# Patient Record
Sex: FEMALE | Race: WHITE | NOT HISPANIC OR LATINO | Employment: FULL TIME | URBAN - METROPOLITAN AREA
[De-identification: names, ages, dates, MRNs, and addresses within clinical notes are randomized per-mention and may not be internally consistent; named-entity substitution may affect disease eponyms.]

---

## 2022-07-20 ENCOUNTER — OFFICE VISIT (OUTPATIENT)
Dept: URGENT CARE | Facility: CLINIC | Age: 62
End: 2022-07-20
Payer: COMMERCIAL

## 2022-07-20 VITALS
TEMPERATURE: 98 F | OXYGEN SATURATION: 97 % | WEIGHT: 200 LBS | DIASTOLIC BLOOD PRESSURE: 84 MMHG | SYSTOLIC BLOOD PRESSURE: 125 MMHG | HEIGHT: 68 IN | HEART RATE: 63 BPM | BODY MASS INDEX: 30.31 KG/M2 | RESPIRATION RATE: 16 BRPM

## 2022-07-20 DIAGNOSIS — B96.89 BACTERIAL URI: Primary | ICD-10-CM

## 2022-07-20 DIAGNOSIS — R05.9 COUGH: ICD-10-CM

## 2022-07-20 DIAGNOSIS — J06.9 BACTERIAL URI: Primary | ICD-10-CM

## 2022-07-20 LAB
CTP QC/QA: YES
SARS-COV-2 RDRP RESP QL NAA+PROBE: NEGATIVE

## 2022-07-20 PROCEDURE — U0002: ICD-10-PCS | Mod: QW,S$GLB,, | Performed by: PHYSICIAN ASSISTANT

## 2022-07-20 PROCEDURE — 3074F PR MOST RECENT SYSTOLIC BLOOD PRESSURE < 130 MM HG: ICD-10-PCS | Mod: CPTII,S$GLB,, | Performed by: PHYSICIAN ASSISTANT

## 2022-07-20 PROCEDURE — 3008F BODY MASS INDEX DOCD: CPT | Mod: CPTII,S$GLB,, | Performed by: PHYSICIAN ASSISTANT

## 2022-07-20 PROCEDURE — 1160F PR REVIEW ALL MEDS BY PRESCRIBER/CLIN PHARMACIST DOCUMENTED: ICD-10-PCS | Mod: CPTII,S$GLB,, | Performed by: PHYSICIAN ASSISTANT

## 2022-07-20 PROCEDURE — U0002 COVID-19 LAB TEST NON-CDC: HCPCS | Mod: QW,S$GLB,, | Performed by: PHYSICIAN ASSISTANT

## 2022-07-20 PROCEDURE — 1160F RVW MEDS BY RX/DR IN RCRD: CPT | Mod: CPTII,S$GLB,, | Performed by: PHYSICIAN ASSISTANT

## 2022-07-20 PROCEDURE — 3079F DIAST BP 80-89 MM HG: CPT | Mod: CPTII,S$GLB,, | Performed by: PHYSICIAN ASSISTANT

## 2022-07-20 PROCEDURE — 1159F PR MEDICATION LIST DOCUMENTED IN MEDICAL RECORD: ICD-10-PCS | Mod: CPTII,S$GLB,, | Performed by: PHYSICIAN ASSISTANT

## 2022-07-20 PROCEDURE — 3008F PR BODY MASS INDEX (BMI) DOCUMENTED: ICD-10-PCS | Mod: CPTII,S$GLB,, | Performed by: PHYSICIAN ASSISTANT

## 2022-07-20 PROCEDURE — 99203 OFFICE O/P NEW LOW 30 MIN: CPT | Mod: S$GLB,,, | Performed by: PHYSICIAN ASSISTANT

## 2022-07-20 PROCEDURE — 3079F PR MOST RECENT DIASTOLIC BLOOD PRESSURE 80-89 MM HG: ICD-10-PCS | Mod: CPTII,S$GLB,, | Performed by: PHYSICIAN ASSISTANT

## 2022-07-20 PROCEDURE — 3074F SYST BP LT 130 MM HG: CPT | Mod: CPTII,S$GLB,, | Performed by: PHYSICIAN ASSISTANT

## 2022-07-20 PROCEDURE — 99203 PR OFFICE/OUTPT VISIT, NEW, LEVL III, 30-44 MIN: ICD-10-PCS | Mod: S$GLB,,, | Performed by: PHYSICIAN ASSISTANT

## 2022-07-20 PROCEDURE — 1159F MED LIST DOCD IN RCRD: CPT | Mod: CPTII,S$GLB,, | Performed by: PHYSICIAN ASSISTANT

## 2022-07-20 RX ORDER — AZITHROMYCIN 250 MG/1
TABLET, FILM COATED ORAL
Qty: 6 TABLET | Refills: 0 | Status: SHIPPED | OUTPATIENT
Start: 2022-07-20

## 2022-07-20 RX ORDER — ALBUTEROL SULFATE 90 UG/1
2 AEROSOL, METERED RESPIRATORY (INHALATION) EVERY 6 HOURS PRN
Qty: 18 G | Refills: 0 | Status: SHIPPED | OUTPATIENT
Start: 2022-07-20 | End: 2022-07-22

## 2022-07-20 RX ORDER — PROMETHAZINE HYDROCHLORIDE AND DEXTROMETHORPHAN HYDROBROMIDE 6.25; 15 MG/5ML; MG/5ML
5 SYRUP ORAL EVERY 8 HOURS PRN
Qty: 118 ML | Refills: 0 | Status: SHIPPED | OUTPATIENT
Start: 2022-07-20 | End: 2022-07-27

## 2022-07-21 NOTE — PATIENT INSTRUCTIONS
PLEASE READ YOUR DISCHARGE INSTRUCTIONS ENTIRELY AS IT CONTAINS IMPORTANT INFORMATION.  - Rest.    - Drink plenty of fluids.    - Tylenol or Ibuprofen as directed as needed for fever/pain.    - If you were prescribed antibiotics, please take them to completion.  - If you are female and on birth control pills - please use additional methods of contraception to prevent pregnancy while on antibiotics and for one cycle after.   - If you were prescribed a narcotic medication, a cough syrup, or a muscle relaxer, do not drive or operate heavy equipment or machinery while taking these medications, as they can cause drowsiness.   - If a referral to a specialty was made today, you should receive a phone call in the next few days to schedule an appt.  Please call [phone] to schedule an appt if have not gotten a phone call in the next few days.  - If you smoke, please stop smoking.  -You must understand that you've received an Urgent Care treatment only and that you may be released before all your medical problems are known or treated. You, the patient, will arrange for follow up care as instructed. Please arrange follow up with your primary medical clinic as soon as possible.   - Follow up with your PCP or specialty clinic as directed in the next 1-2 weeks if not improved or as needed.  You can call [phone] to schedule an appointment with the appropriate provider.    - Please return to Urgent Care or to the Emergency Department if your symptoms worsen.    Patient aware and verbalized understanding.

## 2022-07-21 NOTE — PROGRESS NOTES
"Subjective:       Patient ID: Daniela Chandler is a 62 y.o. female.    Vitals:  height is 5' 8" (1.727 m) and weight is 90.7 kg (200 lb). Her temperature is 98 °F (36.7 °C). Her blood pressure is 125/84 and her pulse is 63. Her respiration is 16 and oxygen saturation is 97%.     Chief Complaint: Cough    Ms. Chandler presents for evaluation of cough, headache, PND, wheezing, congestion that started about two weeks ago.  She is an active smoker.  She denies any fevers, chills, shortness of breath, chest pain, leg swelling, nausea, vomiting, diarrhea, anosmia or ageusia.  She has tried Mucinex DM and OTC cough suppressant with little relief.          Cough  This is a new problem. The current episode started 1 to 4 weeks ago. The problem has been unchanged. The problem occurs constantly. The cough is non-productive. Associated symptoms include headaches, nasal congestion, postnasal drip and wheezing. Pertinent negatives include no chest pain, chills, ear pain, fever, myalgias, rash, sore throat or shortness of breath. Nothing aggravates the symptoms. She has tried OTC cough suppressant (Mucinex Dm) for the symptoms. The treatment provided no relief. Her past medical history is significant for asthma.       Constitution: Negative for appetite change, chills, sweating, fatigue and fever.   HENT: Positive for congestion and postnasal drip. Negative for ear pain, ear discharge, hearing loss, drooling, sinus pain, sinus pressure and sore throat.    Neck: Negative for neck stiffness and painful lymph nodes.   Cardiovascular: Negative for chest trauma, chest pain, leg swelling, palpitations, sob on exertion and passing out.   Eyes: Negative for eye pain and blurred vision.   Respiratory: Positive for cough and wheezing. Negative for chest tightness, sputum production and shortness of breath.    Gastrointestinal: Negative for abdominal pain, nausea, vomiting and diarrhea.   Genitourinary: Negative for dysuria, frequency " and urgency.   Musculoskeletal: Negative for joint pain, joint swelling, muscle cramps and muscle ache.   Skin: Negative for rash.   Allergic/Immunologic: Negative for itching and sneezing.   Neurological: Positive for headaches. Negative for dizziness, history of vertigo, light-headedness, passing out, facial drooping, speech difficulty, coordination disturbances, loss of balance and altered mental status.   Hematologic/Lymphatic: Negative for swollen lymph nodes and easy bruising/bleeding. Does not bruise/bleed easily.   Psychiatric/Behavioral: Negative for altered mental status.       Objective:      Physical Exam   Constitutional: She is oriented to person, place, and time. She appears well-developed. She is cooperative.  Non-toxic appearance. She does not appear ill. No distress.   HENT:   Head: Normocephalic and atraumatic.   Ears:   Right Ear: Hearing, tympanic membrane, external ear and ear canal normal.   Left Ear: Hearing, tympanic membrane, external ear and ear canal normal.   Nose: Nose normal. No mucosal edema, rhinorrhea or nasal deformity. No epistaxis. Right sinus exhibits no maxillary sinus tenderness and no frontal sinus tenderness. Left sinus exhibits no maxillary sinus tenderness and no frontal sinus tenderness.   Mouth/Throat: Uvula is midline, oropharynx is clear and moist and mucous membranes are normal. No trismus in the jaw. Normal dentition. No uvula swelling. No oropharyngeal exudate, posterior oropharyngeal edema or posterior oropharyngeal erythema. No tonsillar exudate.   Eyes: Conjunctivae and lids are normal. No scleral icterus.   Neck: Trachea normal and phonation normal. Neck supple. No edema present. No erythema present. No neck rigidity present.   Cardiovascular: Normal rate, regular rhythm, normal heart sounds and normal pulses.   Pulmonary/Chest: Effort normal. No stridor. No respiratory distress. She has no decreased breath sounds. She has wheezes. She has no rhonchi. She has no  rales.   Exp wheeze         Comments: Exp wheeze    Abdominal: Normal appearance.   Musculoskeletal: Normal range of motion.         General: No deformity. Normal range of motion.   Neurological: She is alert and oriented to person, place, and time. She exhibits normal muscle tone. Coordination normal.   Skin: Skin is warm, dry, intact, not diaphoretic and not pale.   Psychiatric: Her speech is normal and behavior is normal. Judgment and thought content normal.   Nursing note and vitals reviewed.          Results for orders placed or performed in visit on 07/20/22   POCT COVID-19 Rapid Screening   Result Value Ref Range    POC Rapid COVID Negative Negative     Acceptable Yes        Assessment:       1. Bacterial URI    2. Cough          Plan:         Bacterial URI    Cough  -     POCT COVID-19 Rapid Screening    Other orders  -     promethazine-dextromethorphan (PROMETHAZINE-DM) 6.25-15 mg/5 mL Syrp; Take 5 mLs by mouth every 8 (eight) hours as needed (cough).  Dispense: 118 mL; Refill: 0  -     azithromycin (Z-ANMOL) 250 MG tablet; Take 2 tablets by mouth on day 1; Take 1 tablet by mouth on days 2-5  Dispense: 6 tablet; Refill: 0  -     albuterol (PROAIR HFA) 90 mcg/actuation inhaler; Inhale 2 puffs into the lungs every 6 (six) hours as needed for Wheezing. Rescue  Dispense: 18 g; Refill: 0    Diagnoses and plan discussed with the patient, as well as the expected course and duration of her symptoms. All questions and concerns were addressed prior to discharge.  She was advised to follow up with her PCP within 1 week if symptoms do not improve. Emergency department precautions were given. Patient verbalized understanding and was happy with the plan of care.   Note dictated with voice recognition software, please excuse any grammatical errors.    Patient Instructions   PLEASE READ YOUR DISCHARGE INSTRUCTIONS ENTIRELY AS IT CONTAINS IMPORTANT INFORMATION.  - Rest.    - Drink plenty of fluids.    - Tylenol  or Ibuprofen as directed as needed for fever/pain.    - If you were prescribed antibiotics, please take them to completion.  - If you are female and on birth control pills - please use additional methods of contraception to prevent pregnancy while on antibiotics and for one cycle after.   - If you were prescribed a narcotic medication, a cough syrup, or a muscle relaxer, do not drive or operate heavy equipment or machinery while taking these medications, as they can cause drowsiness.   - If a referral to a specialty was made today, you should receive a phone call in the next few days to schedule an appt.  Please call [phone] to schedule an appt if have not gotten a phone call in the next few days.  - If you smoke, please stop smoking.  -You must understand that you've received an Urgent Care treatment only and that you may be released before all your medical problems are known or treated. You, the patient, will arrange for follow up care as instructed. Please arrange follow up with your primary medical clinic as soon as possible.   - Follow up with your PCP or specialty clinic as directed in the next 1-2 weeks if not improved or as needed.  You can call [phone] to schedule an appointment with the appropriate provider.    - Please return to Urgent Care or to the Emergency Department if your symptoms worsen.    Patient aware and verbalized understanding.

## 2025-04-23 ENCOUNTER — LAB VISIT (OUTPATIENT)
Dept: LAB | Facility: HOSPITAL | Age: 65
End: 2025-04-23
Attending: FAMILY MEDICINE
Payer: COMMERCIAL

## 2025-04-23 ENCOUNTER — OFFICE VISIT (OUTPATIENT)
Dept: FAMILY MEDICINE | Facility: CLINIC | Age: 65
End: 2025-04-23
Payer: COMMERCIAL

## 2025-04-23 VITALS
WEIGHT: 178 LBS | SYSTOLIC BLOOD PRESSURE: 125 MMHG | HEIGHT: 68 IN | DIASTOLIC BLOOD PRESSURE: 80 MMHG | BODY MASS INDEX: 26.98 KG/M2

## 2025-04-23 DIAGNOSIS — F41.9 ANXIETY AND DEPRESSION: ICD-10-CM

## 2025-04-23 DIAGNOSIS — K90.0 CELIAC DISEASE: ICD-10-CM

## 2025-04-23 DIAGNOSIS — Z76.89 ENCOUNTER TO ESTABLISH CARE WITH NEW DOCTOR: ICD-10-CM

## 2025-04-23 DIAGNOSIS — R73.03 PREDIABETES: ICD-10-CM

## 2025-04-23 DIAGNOSIS — Z01.419 ENCOUNTER FOR ANNUAL ROUTINE GYNECOLOGICAL EXAMINATION: ICD-10-CM

## 2025-04-23 DIAGNOSIS — Z12.31 SCREENING MAMMOGRAM FOR BREAST CANCER: ICD-10-CM

## 2025-04-23 DIAGNOSIS — E03.9 HYPOTHYROIDISM, UNSPECIFIED TYPE: ICD-10-CM

## 2025-04-23 DIAGNOSIS — Z23 ENCOUNTER FOR IMMUNIZATION: ICD-10-CM

## 2025-04-23 DIAGNOSIS — E78.5 HYPERLIPIDEMIA, UNSPECIFIED HYPERLIPIDEMIA TYPE: ICD-10-CM

## 2025-04-23 DIAGNOSIS — Z00.00 ANNUAL PHYSICAL EXAM: Primary | ICD-10-CM

## 2025-04-23 DIAGNOSIS — Z12.11 ENCOUNTER FOR SCREENING COLONOSCOPY: ICD-10-CM

## 2025-04-23 DIAGNOSIS — Z00.00 ANNUAL PHYSICAL EXAM: ICD-10-CM

## 2025-04-23 DIAGNOSIS — F32.A ANXIETY AND DEPRESSION: ICD-10-CM

## 2025-04-23 LAB
ABSOLUTE EOSINOPHIL (OHS): 0.09 K/UL
ABSOLUTE MONOCYTE (OHS): 0.49 K/UL (ref 0.3–1)
ABSOLUTE NEUTROPHIL COUNT (OHS): 2.62 K/UL (ref 1.8–7.7)
ALBUMIN SERPL BCP-MCNC: 3.8 G/DL (ref 3.5–5.2)
ALP SERPL-CCNC: 134 UNIT/L (ref 40–150)
ALT SERPL W/O P-5'-P-CCNC: 11 UNIT/L (ref 10–44)
ANION GAP (OHS): 7 MMOL/L (ref 8–16)
AST SERPL-CCNC: 15 UNIT/L (ref 11–45)
BASOPHILS # BLD AUTO: 0.02 K/UL
BASOPHILS NFR BLD AUTO: 0.4 %
BILIRUB SERPL-MCNC: 0.9 MG/DL (ref 0.1–1)
BUN SERPL-MCNC: 9 MG/DL (ref 8–23)
CALCIUM SERPL-MCNC: 9.5 MG/DL (ref 8.7–10.5)
CHLORIDE SERPL-SCNC: 106 MMOL/L (ref 95–110)
CHOLEST SERPL-MCNC: 124 MG/DL (ref 120–199)
CHOLEST/HDLC SERPL: 2.3 {RATIO} (ref 2–5)
CO2 SERPL-SCNC: 25 MMOL/L (ref 23–29)
CREAT SERPL-MCNC: 0.7 MG/DL (ref 0.5–1.4)
EAG (OHS): 111 MG/DL (ref 68–131)
ERYTHROCYTE [DISTWIDTH] IN BLOOD BY AUTOMATED COUNT: 12.9 % (ref 11.5–14.5)
GFR SERPLBLD CREATININE-BSD FMLA CKD-EPI: >60 ML/MIN/1.73/M2
GLUCOSE SERPL-MCNC: 103 MG/DL (ref 70–110)
HBA1C MFR BLD: 5.5 % (ref 4–5.6)
HCT VFR BLD AUTO: 39 % (ref 37–48.5)
HDLC SERPL-MCNC: 53 MG/DL (ref 40–75)
HDLC SERPL: 42.7 % (ref 20–50)
HGB BLD-MCNC: 13 GM/DL (ref 12–16)
IMM GRANULOCYTES # BLD AUTO: 0.01 K/UL (ref 0–0.04)
IMM GRANULOCYTES NFR BLD AUTO: 0.2 % (ref 0–0.5)
IRON SATN MFR SERPL: 37 % (ref 20–50)
IRON SERPL-MCNC: 144 UG/DL (ref 30–160)
LDLC SERPL CALC-MCNC: 40.8 MG/DL (ref 63–159)
LYMPHOCYTES # BLD AUTO: 1.52 K/UL (ref 1–4.8)
MCH RBC QN AUTO: 29 PG (ref 27–31)
MCHC RBC AUTO-ENTMCNC: 33.3 G/DL (ref 32–36)
MCV RBC AUTO: 87 FL (ref 82–98)
NONHDLC SERPL-MCNC: 71 MG/DL
NUCLEATED RBC (/100WBC) (OHS): 0 /100 WBC
PLATELET # BLD AUTO: 149 K/UL (ref 150–450)
PMV BLD AUTO: 12 FL (ref 9.2–12.9)
POTASSIUM SERPL-SCNC: 4.3 MMOL/L (ref 3.5–5.1)
PROT SERPL-MCNC: 7.1 GM/DL (ref 6–8.4)
RBC # BLD AUTO: 4.49 M/UL (ref 4–5.4)
RELATIVE EOSINOPHIL (OHS): 1.9 %
RELATIVE LYMPHOCYTE (OHS): 32 % (ref 18–48)
RELATIVE MONOCYTE (OHS): 10.3 % (ref 4–15)
RELATIVE NEUTROPHIL (OHS): 55.2 % (ref 38–73)
SODIUM SERPL-SCNC: 138 MMOL/L (ref 136–145)
T4 FREE SERPL-MCNC: 1.06 NG/DL (ref 0.71–1.51)
TIBC SERPL-MCNC: 392 UG/DL (ref 250–450)
TRANSFERRIN SERPL-MCNC: 265 MG/DL (ref 200–375)
TRIGL SERPL-MCNC: 151 MG/DL (ref 30–150)
TSH SERPL-ACNC: 0.01 UIU/ML (ref 0.4–4)
WBC # BLD AUTO: 4.75 K/UL (ref 3.9–12.7)

## 2025-04-23 PROCEDURE — 1159F MED LIST DOCD IN RCRD: CPT | Mod: CPTII,S$GLB,, | Performed by: FAMILY MEDICINE

## 2025-04-23 PROCEDURE — 80061 LIPID PANEL: CPT

## 2025-04-23 PROCEDURE — 3008F BODY MASS INDEX DOCD: CPT | Mod: CPTII,S$GLB,, | Performed by: FAMILY MEDICINE

## 2025-04-23 PROCEDURE — 84466 ASSAY OF TRANSFERRIN: CPT

## 2025-04-23 PROCEDURE — 36415 COLL VENOUS BLD VENIPUNCTURE: CPT

## 2025-04-23 PROCEDURE — 83036 HEMOGLOBIN GLYCOSYLATED A1C: CPT

## 2025-04-23 PROCEDURE — 85025 COMPLETE CBC W/AUTO DIFF WBC: CPT

## 2025-04-23 PROCEDURE — 90471 IMMUNIZATION ADMIN: CPT | Mod: S$GLB,,, | Performed by: FAMILY MEDICINE

## 2025-04-23 PROCEDURE — 90677 PCV20 VACCINE IM: CPT | Mod: S$GLB,,, | Performed by: FAMILY MEDICINE

## 2025-04-23 PROCEDURE — 99386 PREV VISIT NEW AGE 40-64: CPT | Mod: 25,S$GLB,, | Performed by: FAMILY MEDICINE

## 2025-04-23 PROCEDURE — 99999 PR PBB SHADOW E&M-EST. PATIENT-LVL IV: CPT | Mod: PBBFAC,,, | Performed by: FAMILY MEDICINE

## 2025-04-23 PROCEDURE — 3079F DIAST BP 80-89 MM HG: CPT | Mod: CPTII,S$GLB,, | Performed by: FAMILY MEDICINE

## 2025-04-23 PROCEDURE — 84439 ASSAY OF FREE THYROXINE: CPT

## 2025-04-23 PROCEDURE — 80053 COMPREHEN METABOLIC PANEL: CPT

## 2025-04-23 PROCEDURE — 3074F SYST BP LT 130 MM HG: CPT | Mod: CPTII,S$GLB,, | Performed by: FAMILY MEDICINE

## 2025-04-23 RX ORDER — MONTELUKAST SODIUM 10 MG/1
TABLET ORAL
COMMUNITY

## 2025-04-23 RX ORDER — ROSUVASTATIN CALCIUM 10 MG/1
10 TABLET, COATED ORAL NIGHTLY
COMMUNITY
Start: 2024-12-23

## 2025-04-23 RX ORDER — ATORVASTATIN CALCIUM 10 MG/1
10 TABLET, FILM COATED ORAL
COMMUNITY
End: 2025-04-23

## 2025-04-23 RX ORDER — THYROID 120 MG/1
120 TABLET ORAL
COMMUNITY
Start: 2025-03-17

## 2025-04-23 RX ORDER — FLUOXETINE HYDROCHLORIDE 40 MG/1
CAPSULE ORAL
COMMUNITY

## 2025-04-23 RX ORDER — LEVOTHYROXINE SODIUM 50 UG/1
50 TABLET ORAL
COMMUNITY

## 2025-04-23 RX ORDER — METFORMIN HYDROCHLORIDE 500 MG/1
500 TABLET ORAL 2 TIMES DAILY WITH MEALS
COMMUNITY

## 2025-04-23 RX ORDER — BISOPROLOL FUMARATE 5 MG/1
TABLET, FILM COATED ORAL
COMMUNITY
Start: 2024-12-21

## 2025-04-23 NOTE — PROGRESS NOTES
(Portions of this note were dictated using voice recognition software and may contain dictation related errors in spelling/grammar/syntax not found on text review)    CC:   Chief Complaint   Patient presents with    [facility]       HPI: 65 y.o. female presented to [facility] as a new pt for routine annual check up and labs. She has medical history significant for prediabetes, HLD, nicotine dependence, hypothyroidism, anxiety and depression, celiac disease.    Prediabetes: she was taking metformin for years, states she had GI side effects with metformin and it made her sick, she stopped taking it few month ago, since then she has been feeling more tired. She has been monitoring her blood sugars at home and presented with a log, review of the log seems like that fasting blood sugars are less than 126. Last hba1c 6 months ago was 5.9    She used to follow up with GI for celiac disease, reports having symptoms of indigestion and would like to have EGD as it has been several years    MVP: She takes bisoprolol for MVP, denies having any palpitations or chest pain    She takes armour thyroid 120 mg daily, she is due for tsh check    She takes Prozac 40 mg in the morning and Zoloft in the evening for anxiety and depression, reports compliance and symptoms have been stable    She is an active smoker, plans to quit soon as she will be going for gum surgery    She is due for annual labs    She is due for screening mammogram, Pap smear and colonoscopy    No other concerns    No past medical history on file.    No past surgical history on file.    No family history on file.    Social History[1]    Lab Results   Component Value Date    WBC 6.58 02/19/2010    HGB 13.1 02/19/2010    HCT 38.4 02/19/2010    MCV 87.5 02/19/2010     02/19/2010     02/19/2010    K 3.9 02/19/2010     02/19/2010    CREATININE 0.7 02/19/2010    ESTGFRAFRICA >60 02/19/2010    EGFRNONAA >60 02/19/2010    CALCIUM 9.1 02/19/2010     BUN 7 02/19/2010    CO2 28 02/19/2010    INR 1.0 02/19/2010     02/19/2010             Vital signs reviewed  PE:   APPEARANCE: Well nourished, well developed, in no acute distress.    HEAD: Normocephalic, atraumatic.  EYES: EOMI.  Conjunctivae noninjected.  NOSE: Mucosa pink. Airway clear.  NECK: Supple with no cervical lymphadenopathy.    CHEST: Good inspiratory effort. Lungs clear to auscultation with no wheezes or crackles.  CARDIOVASCULAR: Normal S1, S2. No rubs, murmurs, or gallops.  ABDOMEN: Bowel sounds normal. Not distended. Soft. No tenderness or masses. No organomegaly.  EXTREMITIES: No edema, cyanosis, or clubbing.    Review of Systems   Constitutional:  Negative for chills, fatigue and fever.   HENT: Negative.     Respiratory:  Negative for cough, shortness of breath and wheezing.    Cardiovascular:  Negative for chest pain, palpitations and leg swelling.   Gastrointestinal: Negative.    Genitourinary: Negative.    Neurological: Negative.    Psychiatric/Behavioral: Negative.     All other systems reviewed and are negative.      IMPRESSION  1. Annual physical exam    2. Screening mammogram for breast cancer    3. Encounter for screening colonoscopy    4. Celiac disease    5. Encounter for annual routine gynecological examination    6. Encounter for immunization    7. Encounter to establish care with new doctor    8. Hypothyroidism, unspecified type    9. Prediabetes    10. Hyperlipidemia, unspecified hyperlipidemia type    11. Anxiety and depression            PLAN      1. Annual physical exam (Primary)    - CBC Auto Differential; Future  - Comprehensive Metabolic Panel; Future  - Hemoglobin A1C; Future  - Lipid Panel; Future  - TSH; Future      2. Screening mammogram for breast cancer    - Mammo Digital Screening Bilat w/ Chano (XPD); Future      3. Encounter for screening colonoscopy    - Ambulatory referral/consult to Endo Procedure ; Future      4. Celiac disease    - Ambulatory  referral/consult to Gastroenterology; Future      5. Encounter for annual routine gynecological examination    - Ambulatory referral/consult to Gynecology; Future      6. Encounter for immunization    - pneumoc 20-annette conj-dip cr(PF) (PREVNAR-20 (PF)) injection Syrg 0.5 mL      7. Encounter to establish care with new doctor        8. Hypothyroidism, unspecified type    Tsh ordered    Continue synthyroid      9. Prediabetes    To check A1c      10. Hyperlipidemia, unspecified hyperlipidemia type    Continue Crestor      11. Anxiety and depression    Stable    Continue current medications        SCREENINGS        Age/demographic appropriate health maintenance:    Health Maintenance Due   Topic Date Due    Lipid Panel  Never done    Mammogram  Never done    Pneumococcal Vaccines (Age 50+) (1 of 2 - PCV) Never done    Hemoglobin A1c (Diabetic Prevention Screening)  Never done    Colorectal Cancer Screening  Never done           Spent adequate time in obtaining history and explaining differentials     40 minutes spent during this visit of which greater than 50% devoted to face-face counseling and coordination of care regarding diagnosis and management plan       Raven Mcdaniel   4/23/2025       [1]   Social History  Tobacco Use    Smoking status: Every Day     Current packs/day: 0.25     Average packs/day: 0.3 packs/day for 40.0 years (10.0 ttl pk-yrs)     Types: Cigarettes    Smokeless tobacco: Current

## 2025-04-30 ENCOUNTER — TELEPHONE (OUTPATIENT)
Dept: OBSTETRICS AND GYNECOLOGY | Facility: CLINIC | Age: 65
End: 2025-04-30
Payer: COMMERCIAL

## 2025-04-30 ENCOUNTER — OFFICE VISIT (OUTPATIENT)
Dept: OBSTETRICS AND GYNECOLOGY | Facility: CLINIC | Age: 65
End: 2025-04-30
Payer: COMMERCIAL

## 2025-04-30 VITALS
DIASTOLIC BLOOD PRESSURE: 83 MMHG | WEIGHT: 182.13 LBS | HEART RATE: 67 BPM | BODY MASS INDEX: 27.69 KG/M2 | SYSTOLIC BLOOD PRESSURE: 127 MMHG

## 2025-04-30 DIAGNOSIS — Z01.419 ENCOUNTER FOR ANNUAL ROUTINE GYNECOLOGICAL EXAMINATION: ICD-10-CM

## 2025-04-30 DIAGNOSIS — Z01.419 WELL WOMAN EXAM WITH ROUTINE GYNECOLOGICAL EXAM: Primary | ICD-10-CM

## 2025-04-30 DIAGNOSIS — N76.0 ACUTE VAGINITIS: ICD-10-CM

## 2025-04-30 PROCEDURE — 3008F BODY MASS INDEX DOCD: CPT | Mod: CPTII,S$GLB,,

## 2025-04-30 PROCEDURE — 3044F HG A1C LEVEL LT 7.0%: CPT | Mod: CPTII,S$GLB,,

## 2025-04-30 PROCEDURE — 1160F RVW MEDS BY RX/DR IN RCRD: CPT | Mod: CPTII,S$GLB,,

## 2025-04-30 PROCEDURE — 3079F DIAST BP 80-89 MM HG: CPT | Mod: CPTII,S$GLB,,

## 2025-04-30 PROCEDURE — 99386 PREV VISIT NEW AGE 40-64: CPT | Mod: S$GLB,,,

## 2025-04-30 PROCEDURE — 99999 PR PBB SHADOW E&M-EST. PATIENT-LVL III: CPT | Mod: PBBFAC,,,

## 2025-04-30 PROCEDURE — 3074F SYST BP LT 130 MM HG: CPT | Mod: CPTII,S$GLB,,

## 2025-04-30 PROCEDURE — 1159F MED LIST DOCD IN RCRD: CPT | Mod: CPTII,S$GLB,,

## 2025-04-30 RX ORDER — NEBIVOLOL 5 MG/1
10 TABLET ORAL DAILY
COMMUNITY

## 2025-04-30 RX ORDER — SERTRALINE HYDROCHLORIDE 50 MG/1
50 TABLET, FILM COATED ORAL DAILY
COMMUNITY

## 2025-04-30 NOTE — TELEPHONE ENCOUNTER
Called pt regarding her appt for today at 4:20 offering her a 1:40appt instead. Pt denied that time.

## 2025-04-30 NOTE — TELEPHONE ENCOUNTER
----- Message from Susan sent at 4/30/2025 10:35 AM CDT -----  Type:  Patient Returning CallWho Called: pt Who Left Message for Patient: officeDoes the patient know what this is regarding?:appointment that is scheduled for Today 4/30 Would the patient rather a call back or a response via [facility]?  Call back Best Call Back Number: [phone] Information:

## 2025-04-30 NOTE — PROGRESS NOTES
"SUBJECTIVE:   65 y.o. female  presents for annual well woman exam.   No LMP recorded. Patient is postmenopausal. Age of first menarche: 13/14.  at age 52, not on HRT. Denies  bleeding.  Is currently sexually active with one male. Declines STD testing with blood work.  Reports some abnormal vaginal odor "for a while". She takes long hot baths with fragrant body wash and bubble baths.   She is also c/o hot flashes that occur occasionally since menopause. She is not interested in hormonal therapy.  Denies any breast, urinary, or bowel complaints.     Established care with new PCP on 25.    Denies domestic violence.        Family history: No family history of breast, ovarian, endometrial and colon cancer    Pap >10 years ago  -History of abnormal pap-denies  MMG-scheduled, ordered by PCP  Colonoscopy-ordered by PCP  DEXA at age 67           Past Medical History:   Diagnosis Date    Anxiety disorder, unspecified     Hashimoto's disease     High cholesterol     Hypertension      History reviewed. No pertinent surgical history.  Social History[1]  Family History   Problem Relation Name Age of Onset    Breast cancer Neg Hx      Cervical cancer Neg Hx      Ovarian cancer Neg Hx      Uterine cancer Neg Hx      Colon cancer Neg Hx      Fibroids Neg Hx      Endometriosis Neg Hx       OB History    Para Term  AB Living   2     3   SAB IAB Ectopic Multiple Live Births             # Outcome Date GA Lbr Abhinav/2nd Weight Sex Type Anes PTL Lv   2             1                   Current Medications[2]  Allergies: Barley, Malt extract, Oats (yen), Rye grass extract, and Wheat     ROS:  Constitutional: no weight loss, weight gain, fever, fatigue  Eyes:  No vision changes, glasses/contacts  ENT/Mouth: No ulcers, sinus problems, ears ringing, headache  Cardiovascular: No inability to lie flat, chest pain, exercise intolerance, swelling, heart palpitations  Respiratory: No wheezing, coughing " blood, shortness of breath, or cough  Gastrointestinal: No diarrhea, bloody stool, nausea/vomiting, constipation, gas, hemorrhoids  Genitourinary:See HPI  Musculoskeletal: No muscle weakness  Skin/Breast: No painful breasts, nipple discharge, masses, rash, ulcers  Neurological: No passing out, seizures, numbness, headache  Endocrine: See HPI  Psychiatric: No depression, crying  Hematologic: No bruises, bleeding, swollen lymph nodes, anemia.      OBJECTIVE:   The patient appears well, alert, oriented x 3, in no distress.  /83 (Patient Position: Sitting)   Pulse 67   Wt 82.6 kg (182 lb 1.6 oz)   BMI 27.69 kg/m²   NECK: no thyromegaly, trachea midline  SKIN: no acne, striae, hirsutism  BREAST EXAM: breasts appear normal, no suspicious masses, no skin or nipple changes or axillary nodes  ABDOMEN: no hernias, masses, or hepatosplenomegaly  GENITALIA: normal external genitalia, no erythema, no discharge  URETHRA: normal urethra, normal urethral meatus  VAGINA: There is white thin watery discharge  CERVIX: no lesions or cervical motion tenderness  UTERUS: normal  ADNEXA: no mass, fullness, tenderness      ASSESSMENT:   Diagnoses and all orders for this visit:    Well woman exam with routine gynecological exam  -     Liquid-Based Pap Smear, Screening    Encounter for annual routine gynecological examination  -     Ambulatory referral/consult to Gynecology    Acute vaginitis  -     Vaginosis Screen by DNA Probe        Orders Placed This Encounter   Procedures    Vaginosis Screen by DNA Probe     Non-hormonal treatments discussed. She would like to start paxil and discontinue zoloft.  She will discuss with PCP. Offered psychiatry referral-she declined at this time.     Below are tips to avoiding vaginitis:  a. Avoid feminine products such as deoderant soaps, body wash, bubble bath, douches, scented toilet paper, deoderant tampons or pads, feminine wipes, chronic pad use.  b. Avoid other vulvovaginal irritants such as  long hot baths, humidity, tight, synthetic clothing, chlorine and sitting around in wet bathing suits  c. Wear cotton underwear, avoid thong underwear and no underwear to bed  d. Take showers instead of baths and use a hair dryer on cool setting afterwards to dry  e. Wear cotton to exercise and shower immediately after exercise and change clothes  f. Use condoms without spermicide if sexually active and symptoms are triggered by intercourse    Follow up in 1 year for annual exam or as needed.  Dorothy Ace PA-C         [1]   Social History  Socioeconomic History    Marital status: Single   Tobacco Use    Smoking status: Every Day     Current packs/day: 0.25     Average packs/day: 0.3 packs/day for 40.0 years (10.0 ttl pk-yrs)     Types: Cigarettes    Smokeless tobacco: Current   Substance and Sexual Activity    Alcohol use: Not Currently    Drug use: Never    Sexual activity: Yes     Partners: Male     Birth control/protection: None     Social Drivers of Health     Financial Resource Strain: Low Risk  (4/22/2025)    Overall Financial Resource Strain (CARDIA)     Difficulty of Paying Living Expenses: Not very hard   Food Insecurity: No Food Insecurity (4/22/2025)    Hunger Vital Sign     Worried About Running Out of Food in the Last Year: Never true     Ran Out of Food in the Last Year: Never true   Transportation Needs: No Transportation Needs (4/22/2025)    PRAPARE - Transportation     Lack of Transportation (Medical): No     Lack of Transportation (Non-Medical): No   Physical Activity: Unknown (4/22/2025)    Exercise Vital Sign     Days of Exercise per Week: 0 days   Stress: No Stress Concern Present (4/22/2025)    [employer] - Occupational Stress Questionnaire     Feeling of Stress : Not at all   Housing Stability: Low Risk  (4/22/2025)    Housing Stability Vital Sign     Unable to Pay for Housing in the Last Year: No     Number of Times Moved in the Last Year: 1     Homeless in  the Last Year: No   [2]   Current Outpatient Medications   Medication Sig Dispense Refill    FLUoxetine 40 MG capsule       montelukast (SINGULAIR) 10 mg tablet       nebivoloL (BYSTOLIC) 5 MG Tab Take 10 mg by mouth once daily.      rosuvastatin (CRESTOR) 10 MG tablet Take 10 mg by mouth every evening.      sertraline (ZOLOFT) 50 MG tablet Take 50 mg by mouth once daily.      thyroid, pork, (ARMOUR THYROID) 120 mg Tab Take 120 mg by mouth.      bisoprolol (ZEBETA) 5 MG tablet Take by mouth. (Patient not taking: Reported on 4/30/2025)      levalbuterol (XOPENEX HFA) 45 mcg/actuation inhaler Inhale 1-2 puffs into the lungs every 6 (six) hours as needed for Wheezing. 15 g 0    levothyroxine (SYNTHROID) 50 MCG tablet Take 50 mcg by mouth before breakfast. (Patient not taking: Reported on 4/30/2025)      metFORMIN (GLUCOPHAGE) 500 MG tablet Take 500 mg by mouth 2 (two) times daily with meals. (Patient not taking: Reported on 4/30/2025)       No current facility-administered medications for this visit.

## 2025-05-01 ENCOUNTER — RESULTS FOLLOW-UP (OUTPATIENT)
Dept: FAMILY MEDICINE | Facility: CLINIC | Age: 65
End: 2025-05-01

## 2025-05-01 DIAGNOSIS — E03.9 HYPOTHYROIDISM, UNSPECIFIED TYPE: Primary | ICD-10-CM

## 2025-05-03 ENCOUNTER — HOSPITAL ENCOUNTER (OUTPATIENT)
Dept: RADIOLOGY | Facility: HOSPITAL | Age: 65
Discharge: HOME | End: 2025-05-03
Attending: FAMILY MEDICINE
Payer: COMMERCIAL

## 2025-05-03 DIAGNOSIS — Z12.31 SCREENING MAMMOGRAM FOR BREAST CANCER: ICD-10-CM

## 2025-05-03 PROCEDURE — 77063 BREAST TOMOSYNTHESIS BI: CPT | Mod: TC

## 2025-05-03 PROCEDURE — 77063 BREAST TOMOSYNTHESIS BI: CPT | Mod: 26,,, | Performed by: RADIOLOGY

## 2025-05-03 PROCEDURE — 77067 SCR MAMMO BI INCL CAD: CPT | Mod: 26,,, | Performed by: RADIOLOGY

## 2025-05-05 ENCOUNTER — RESULTS FOLLOW-UP (OUTPATIENT)
Dept: OBSTETRICS AND GYNECOLOGY | Facility: CLINIC | Age: 65
End: 2025-05-05

## 2025-05-05 RX ORDER — METRONIDAZOLE 500 MG/1
500 TABLET ORAL EVERY 12 HOURS
Qty: 14 TABLET | Refills: 0 | Status: SHIPPED | OUTPATIENT
Start: 2025-05-05 | End: 2025-05-07

## 2025-05-06 ENCOUNTER — TELEPHONE (OUTPATIENT)
Dept: OBSTETRICS AND GYNECOLOGY | Facility: CLINIC | Age: 65
End: 2025-05-06
Payer: COMMERCIAL

## 2025-05-06 NOTE — TELEPHONE ENCOUNTER
----- Message from Kerry sent at 5/6/2025 12:44 PM CDT -----  Type:  RX Refill RequestWho Called: pt Refill or New Rx:rx RX Name and Strength:metroNIDAZOLE (FLAGYL) 500 MG tabletPreferred Pharmacy with phone number:[facility] [place] - [address] [place] [address] or Mail Order:local Ordering Provider:hernan Would the patient rather a call back or a response via [facility]? Call Best Call Back Number: [phone] Information: Please send to pharmacy listed above

## 2025-05-06 NOTE — TELEPHONE ENCOUNTER
Pt called regarding her medication sent to the wrong pharmacy. Explained to pt that Dorothy is not in clinic today but I will send a message to Dorothy to send medication to the right pharmacy. [facility]- [address] [place] [address]

## 2025-05-07 ENCOUNTER — TELEPHONE (OUTPATIENT)
Dept: OBSTETRICS AND GYNECOLOGY | Facility: CLINIC | Age: 65
End: 2025-05-07
Payer: COMMERCIAL

## 2025-05-07 RX ORDER — METRONIDAZOLE 500 MG/1
500 TABLET ORAL EVERY 12 HOURS
Qty: 14 TABLET | Refills: 0 | Status: SHIPPED | OUTPATIENT
Start: 2025-05-07

## 2025-05-23 RX ORDER — ROSUVASTATIN CALCIUM 10 MG/1
10 TABLET, COATED ORAL NIGHTLY
Qty: 90 TABLET | Refills: 3 | Status: SHIPPED | OUTPATIENT
Start: 2025-05-23

## 2025-06-03 ENCOUNTER — TELEPHONE (OUTPATIENT)
Dept: GASTROENTEROLOGY | Facility: CLINIC | Age: 65
End: 2025-06-03
Payer: COMMERCIAL

## 2025-06-06 ENCOUNTER — TELEPHONE (OUTPATIENT)
Dept: GASTROENTEROLOGY | Facility: CLINIC | Age: 65
End: 2025-06-06
Payer: COMMERCIAL

## 2025-06-06 ENCOUNTER — PATIENT MESSAGE (OUTPATIENT)
Dept: GASTROENTEROLOGY | Facility: CLINIC | Age: 65
End: 2025-06-06
Payer: COMMERCIAL

## 2025-06-19 RX ORDER — ROSUVASTATIN CALCIUM 10 MG/1
10 TABLET, COATED ORAL NIGHTLY
Qty: 90 TABLET | Refills: 3 | Status: SHIPPED | OUTPATIENT
Start: 2025-06-19

## 2025-06-19 NOTE — TELEPHONE ENCOUNTER
Copied from CRM #[id number]. Topic: Medications - Medication Refill  >> Jun 19, 2025  9:00 AM Delmis wrote:  Type:  RX Refill Request    Who Called: pt   Refill or New Rx:new rx   RX Name and Strength:sertraline (ZOLOFT) 50 MG tablet   How is the patient currently taking it? (ex. 1XDay):Sig - Route: Take 50 mg by mouth once daily. - Oral   Is this a 30 day or 90 day RX:90  Preferred Pharmacy with phone number:[facility] - [place] - [address] [place] [address]  [address] [place] [address] [place] [address]  Phone: [phone] Fax: [phone]  Hours: Not open 24 hours      Local or Mail Order:local   Ordering Provider:   Would the patient rather a call back or a response via [facility]? Call back   Best Call Back Number:[phone] (M)    Additional Information: pt will need kajal for procedure on 07/16    Refill or New Rx:new rx   RX Name and Strength:nebivoloL (BYSTOLIC) 5 MG Tab   How is the patient currently taking it? (ex. 1XDay):Sig - Route: Take 10 mg by mouth once daily. - Oral   Is this a 30 day or 90 day RX:90

## 2025-06-20 RX ORDER — SERTRALINE HYDROCHLORIDE 50 MG/1
50 TABLET, FILM COATED ORAL DAILY
Qty: 90 TABLET | Refills: 1 | Status: SHIPPED | OUTPATIENT
Start: 2025-06-20

## 2025-06-20 RX ORDER — NEBIVOLOL 5 MG/1
10 TABLET ORAL DAILY
Qty: 60 TABLET | Refills: 3 | Status: SHIPPED | OUTPATIENT
Start: 2025-06-20

## 2025-06-24 ENCOUNTER — TELEPHONE (OUTPATIENT)
Dept: FAMILY MEDICINE | Facility: CLINIC | Age: 65
End: 2025-06-24
Payer: COMMERCIAL

## 2025-06-24 NOTE — TELEPHONE ENCOUNTER
Copied from CRM #[id number]. Topic: General Inquiry - Patient Advice  >> Jun 24, 2025  3:53 PM Barbara wrote:  .Type:  Needs Medical Advice    Who Called: Dia with [facility]    Would the patient rather a call back or a response via [facility]? Call back  Best Call Back Number: [phone]   fax# [phone]  Additional Information:      Dia stated she still waiting on pt medical clearance for her dental procedure

## 2025-06-24 NOTE — TELEPHONE ENCOUNTER
I call [facility] ans ask them to send paper for clearance for patient. Dia said she would fax paper over.

## 2025-06-25 ENCOUNTER — TELEPHONE (OUTPATIENT)
Dept: FAMILY MEDICINE | Facility: CLINIC | Age: 65
End: 2025-06-25
Payer: COMMERCIAL

## 2025-06-25 ENCOUNTER — LAB VISIT (OUTPATIENT)
Dept: LAB | Facility: HOSPITAL | Age: 65
End: 2025-06-25
Attending: FAMILY MEDICINE
Payer: COMMERCIAL

## 2025-06-25 ENCOUNTER — PATIENT MESSAGE (OUTPATIENT)
Dept: FAMILY MEDICINE | Facility: CLINIC | Age: 65
End: 2025-06-25
Payer: COMMERCIAL

## 2025-06-25 DIAGNOSIS — E03.9 HYPOTHYROIDISM, UNSPECIFIED TYPE: ICD-10-CM

## 2025-06-25 LAB
T4 FREE SERPL-MCNC: 0.88 NG/DL (ref 0.71–1.51)
TSH SERPL-ACNC: <0.026 UIU/ML (ref 0.4–4)

## 2025-06-25 PROCEDURE — 84439 ASSAY OF FREE THYROXINE: CPT | Mod: PN

## 2025-06-25 PROCEDURE — 36415 COLL VENOUS BLD VENIPUNCTURE: CPT | Mod: PN

## 2025-06-25 PROCEDURE — 84443 ASSAY THYROID STIM HORMONE: CPT | Mod: PN

## 2025-06-25 NOTE — TELEPHONE ENCOUNTER
Please call and inform TSH is still abnormal - is she still taking synthroid? If not, she need to see endocrinology

## 2025-07-02 ENCOUNTER — TELEPHONE (OUTPATIENT)
Dept: FAMILY MEDICINE | Facility: CLINIC | Age: 65
End: 2025-07-02
Payer: COMMERCIAL

## 2025-07-03 ENCOUNTER — TELEPHONE (OUTPATIENT)
Dept: FAMILY MEDICINE | Facility: CLINIC | Age: 65
End: 2025-07-03
Payer: COMMERCIAL

## 2025-07-03 DIAGNOSIS — E03.9 HYPOTHYROIDISM, UNSPECIFIED TYPE: Primary | ICD-10-CM

## 2025-08-05 ENCOUNTER — TELEPHONE (OUTPATIENT)
Dept: FAMILY MEDICINE | Facility: CLINIC | Age: 65
End: 2025-08-05
Payer: COMMERCIAL

## 2025-08-05 NOTE — TELEPHONE ENCOUNTER
Copied from CRM #[id number]. Topic: Medications - Medication Refill  >> Aug 1, 2025  9:43 AM Adrienne wrote:  Type:  RX Refill Request    Who Called: Daniela  Refill or New Rx:refill  RX Name and Strength:   How is the patient currently taking it? (ex. 1XDay):   Is this a 30 day or 90 day RX:   Preferred Pharmacy with phone number:[facility] or Mail Order:local  Ordering Provider:Dr Mcdaniel  Would the patient rather a call back or a response via [facility]? Call  Best Call Back Number:[phone]  Additional Information: Patient received test results. Would like to know which one of her thyroid meds will you refill.  Patient goes to work for 10:30 and wont be able to answer phone.  >> Aug 5, 2025  9:59 AM Med Assistant Rocio wrote:  Patient receive results of her thyroid and would like to know which strength would you send to the pharmacy for thyroid . Please advice.  ----- Message -----  From: Adrienne Christensen  Sent: 8/1/2025   9:46 AM CDT  To: Jonas Carbajal Staff

## 2025-08-05 NOTE — TELEPHONE ENCOUNTER
Please contact the patient, Dr. Mcdaniel had indicated she wanted her to see endocrinology.    If she is still taking thyroid medication, I recommend she schedule a virtual visit with Dr. Mcdaniel to review the exact dosages

## 2025-08-07 RX ORDER — LEVOTHYROXINE SODIUM 50 UG/1
50 TABLET ORAL
Qty: 30 TABLET | Refills: 1 | Status: SHIPPED | OUTPATIENT
Start: 2025-08-07

## 2025-08-07 NOTE — TELEPHONE ENCOUNTER
Copied from CRM #[id number]. Topic: Medications - Medication Refill  >> Aug 6, 2025  4:43 PM Dorothy wrote:  Type:  RX Refill Request    Who Called: Pt   Refill or New Rx: refill   RX Name and Strength:levothyroxine (SYNTHROID) 50 MCG tablet   Preferred Pharmacy with phone number:  [facility] - [facility] - [address] [facility] [address]  Phone: [phone]  Fax: [phone]  Local or Mail Order: Local   Ordering Provider: Jonas   Would the patient rather a call back or a response via [facility]? Call   Best Call Back Number: [phone]   Additional Information: pt requesting refill until she sees reese at 10.08

## 2025-08-08 DIAGNOSIS — M25.572 LEFT ANKLE PAIN, UNSPECIFIED CHRONICITY: Primary | ICD-10-CM

## 2025-08-11 ENCOUNTER — TELEPHONE (OUTPATIENT)
Dept: FAMILY MEDICINE | Facility: CLINIC | Age: 65
End: 2025-08-11
Payer: COMMERCIAL

## 2025-08-19 DIAGNOSIS — F32.A DEPRESSION, UNSPECIFIED DEPRESSION TYPE: Primary | ICD-10-CM

## 2025-08-19 RX ORDER — FLUOXETINE HYDROCHLORIDE 40 MG/1
40 CAPSULE ORAL DAILY
Qty: 90 CAPSULE | Refills: 2 | Status: SHIPPED | OUTPATIENT
Start: 2025-08-19 | End: 2025-08-20 | Stop reason: SDUPTHER

## 2025-08-20 DIAGNOSIS — F32.A DEPRESSION, UNSPECIFIED DEPRESSION TYPE: ICD-10-CM

## 2025-08-20 RX ORDER — FLUOXETINE HYDROCHLORIDE 40 MG/1
40 CAPSULE ORAL DAILY
Qty: 90 CAPSULE | Refills: 2 | Status: SHIPPED | OUTPATIENT
Start: 2025-08-20

## 2025-08-20 RX ORDER — MONTELUKAST SODIUM 10 MG/1
10 TABLET ORAL DAILY
Qty: 90 TABLET | Refills: 1 | Status: SHIPPED | OUTPATIENT
Start: 2025-08-20